# Patient Record
Sex: FEMALE | Race: BLACK OR AFRICAN AMERICAN | Employment: OTHER | URBAN - METROPOLITAN AREA
[De-identification: names, ages, dates, MRNs, and addresses within clinical notes are randomized per-mention and may not be internally consistent; named-entity substitution may affect disease eponyms.]

---

## 2021-09-20 ENCOUNTER — TRANSCRIBE ORDER (OUTPATIENT)
Dept: SCHEDULING | Age: 76
End: 2021-09-20

## 2021-09-20 DIAGNOSIS — Z12.31 SCREENING MAMMOGRAM FOR HIGH-RISK PATIENT: Primary | ICD-10-CM

## 2021-10-13 ENCOUNTER — HOSPITAL ENCOUNTER (OUTPATIENT)
Dept: MAMMOGRAPHY | Age: 76
Discharge: HOME | End: 2021-10-13
Attending: FAMILY MEDICINE
Payer: MEDICARE

## 2021-10-13 DIAGNOSIS — Z12.31 SCREENING MAMMOGRAM FOR HIGH-RISK PATIENT: ICD-10-CM

## 2021-10-13 PROCEDURE — 77067 SCR MAMMO BI INCL CAD: CPT

## 2022-02-07 ENCOUNTER — OFFICE VISIT (OUTPATIENT)
Dept: NEUROLOGY | Age: 77
End: 2022-02-07

## 2022-07-11 ENCOUNTER — TRANSCRIBE ORDER (OUTPATIENT)
Dept: SCHEDULING | Age: 77
End: 2022-07-11

## 2022-07-11 DIAGNOSIS — R07.9 CHEST PAIN: Primary | ICD-10-CM

## 2022-07-26 ENCOUNTER — HOSPITAL ENCOUNTER (OUTPATIENT)
Dept: NON INVASIVE DIAGNOSTICS | Age: 77
Discharge: HOME | End: 2022-07-26
Attending: INTERNAL MEDICINE
Payer: MEDICARE

## 2022-07-26 VITALS
DIASTOLIC BLOOD PRESSURE: 74 MMHG | HEIGHT: 65 IN | BODY MASS INDEX: 29.99 KG/M2 | WEIGHT: 180 LBS | SYSTOLIC BLOOD PRESSURE: 134 MMHG

## 2022-07-26 DIAGNOSIS — R07.9 CHEST PAIN: ICD-10-CM

## 2022-07-26 LAB
STRESS ANGINA INDEX: 0
STRESS BASELINE DIAS BP: 74 MMHG
STRESS BASELINE HR: 56 BPM
STRESS BASELINE ST DEPRESSION: 0 MM
STRESS BASELINE SYS BP: 134 MMHG
STRESS ESTIMATED WORKLOAD: 4.2 METS
STRESS EXERCISE DUR MIN: 6 MIN
STRESS EXERCISE DUR SEC: 29 SEC
STRESS O2 SAT PEAK: 100 %
STRESS O2 SAT REST: 100 %
STRESS PEAK DIAS BP: 80 MMHG
STRESS PEAK SYS BP: 203 MMHG
STRESS PERCENT HR ACHIEVED: 96 %
STRESS POST PEAK HR: 137 BPM
STRESS RATE PRESSURE PRODUCT: NORMAL BPM*MMHG
STRESS TARGET HR: 142 BPM

## 2022-07-26 PROCEDURE — 93017 CV STRESS TEST TRACING ONLY: CPT

## 2022-07-26 PROCEDURE — 93018 CV STRESS TEST I&R ONLY: CPT | Performed by: SPECIALIST

## 2022-07-26 PROCEDURE — 93016 CV STRESS TEST SUPVJ ONLY: CPT | Performed by: SPECIALIST

## 2022-09-22 ENCOUNTER — TRANSCRIBE ORDER (OUTPATIENT)
Dept: SCHEDULING | Age: 77
End: 2022-09-22

## 2022-09-22 DIAGNOSIS — Z12.31 SCREENING MAMMOGRAM FOR HIGH-RISK PATIENT: Primary | ICD-10-CM

## 2022-10-18 ENCOUNTER — HOSPITAL ENCOUNTER (OUTPATIENT)
Dept: MAMMOGRAPHY | Age: 77
Discharge: HOME | End: 2022-10-18
Attending: FAMILY MEDICINE
Payer: MEDICARE

## 2022-10-18 DIAGNOSIS — Z12.31 SCREENING MAMMOGRAM FOR HIGH-RISK PATIENT: ICD-10-CM

## 2022-10-18 PROCEDURE — 77067 SCR MAMMO BI INCL CAD: CPT

## 2022-12-28 ENCOUNTER — HOSPITAL ENCOUNTER (EMERGENCY)
Age: 77
Discharge: HOME | End: 2022-12-28
Attending: STUDENT IN AN ORGANIZED HEALTH CARE EDUCATION/TRAINING PROGRAM
Payer: MEDICARE

## 2022-12-28 ENCOUNTER — APPOINTMENT (OUTPATIENT)
Dept: GENERAL RADIOLOGY | Age: 77
End: 2022-12-28
Attending: STUDENT IN AN ORGANIZED HEALTH CARE EDUCATION/TRAINING PROGRAM
Payer: MEDICARE

## 2022-12-28 VITALS
BODY MASS INDEX: 32.71 KG/M2 | TEMPERATURE: 98.2 F | RESPIRATION RATE: 20 BRPM | WEIGHT: 191.58 LBS | HEIGHT: 64 IN | DIASTOLIC BLOOD PRESSURE: 85 MMHG | SYSTOLIC BLOOD PRESSURE: 158 MMHG | OXYGEN SATURATION: 100 % | HEART RATE: 81 BPM

## 2022-12-28 DIAGNOSIS — U07.1 COVID-19: Primary | ICD-10-CM

## 2022-12-28 LAB
ALBUMIN SERPL-MCNC: 3.6 G/DL (ref 3.5–5)
ALBUMIN/GLOB SERPL: 1.1 {RATIO} (ref 1.1–2.2)
ALP SERPL-CCNC: 101 U/L (ref 45–117)
ALT SERPL-CCNC: 36 U/L (ref 12–78)
ANION GAP SERPL CALC-SCNC: 4 MMOL/L (ref 5–15)
AST SERPL-CCNC: 24 U/L (ref 15–37)
BASOPHILS # BLD: 0 K/UL (ref 0–0.1)
BASOPHILS NFR BLD: 1 % (ref 0–1)
BILIRUB SERPL-MCNC: 0.4 MG/DL (ref 0.2–1)
BNP SERPL-MCNC: 67 PG/ML
BUN SERPL-MCNC: 14 MG/DL (ref 6–20)
BUN/CREAT SERPL: 11 (ref 12–20)
CALCIUM SERPL-MCNC: 8.7 MG/DL (ref 8.5–10.1)
CHLORIDE SERPL-SCNC: 108 MMOL/L (ref 97–108)
CO2 SERPL-SCNC: 25 MMOL/L (ref 21–32)
CREAT SERPL-MCNC: 1.26 MG/DL (ref 0.55–1.02)
DIFFERENTIAL METHOD BLD: NORMAL
EOSINOPHIL # BLD: 0.2 K/UL (ref 0–0.4)
EOSINOPHIL NFR BLD: 5 % (ref 0–7)
ERYTHROCYTE [DISTWIDTH] IN BLOOD BY AUTOMATED COUNT: 13.8 % (ref 11.5–14.5)
GLOBULIN SER CALC-MCNC: 3.3 G/DL (ref 2–4)
GLUCOSE SERPL-MCNC: 158 MG/DL (ref 65–100)
HCT VFR BLD AUTO: 39.2 % (ref 35–47)
HGB BLD-MCNC: 13.3 G/DL (ref 11.5–16)
IMM GRANULOCYTES # BLD AUTO: 0 K/UL (ref 0–0.04)
IMM GRANULOCYTES NFR BLD AUTO: 0 % (ref 0–0.5)
LYMPHOCYTES # BLD: 1.6 K/UL (ref 0.8–3.5)
LYMPHOCYTES NFR BLD: 34 % (ref 12–49)
MCH RBC QN AUTO: 29.5 PG (ref 26–34)
MCHC RBC AUTO-ENTMCNC: 33.9 G/DL (ref 30–36.5)
MCV RBC AUTO: 86.9 FL (ref 80–99)
MONOCYTES # BLD: 0.4 K/UL (ref 0–1)
MONOCYTES NFR BLD: 9 % (ref 5–13)
NEUTS SEG # BLD: 2.3 K/UL (ref 1.8–8)
NEUTS SEG NFR BLD: 51 % (ref 32–75)
NRBC # BLD: 0 K/UL (ref 0–0.01)
NRBC BLD-RTO: 0 PER 100 WBC
PLATELET # BLD AUTO: 271 K/UL (ref 150–400)
PMV BLD AUTO: 9.5 FL (ref 8.9–12.9)
POTASSIUM SERPL-SCNC: 4.3 MMOL/L (ref 3.5–5.1)
PROT SERPL-MCNC: 6.9 G/DL (ref 6.4–8.2)
RBC # BLD AUTO: 4.51 M/UL (ref 3.8–5.2)
SODIUM SERPL-SCNC: 137 MMOL/L (ref 136–145)
TROPONIN-HIGH SENSITIVITY: 7 NG/L (ref 0–51)
WBC # BLD AUTO: 4.6 K/UL (ref 3.6–11)

## 2022-12-28 PROCEDURE — 99285 EMERGENCY DEPT VISIT HI MDM: CPT

## 2022-12-28 PROCEDURE — 84484 ASSAY OF TROPONIN QUANT: CPT

## 2022-12-28 PROCEDURE — 71046 X-RAY EXAM CHEST 2 VIEWS: CPT

## 2022-12-28 PROCEDURE — 83880 ASSAY OF NATRIURETIC PEPTIDE: CPT

## 2022-12-28 PROCEDURE — 93005 ELECTROCARDIOGRAM TRACING: CPT

## 2022-12-28 PROCEDURE — 85025 COMPLETE CBC W/AUTO DIFF WBC: CPT

## 2022-12-28 PROCEDURE — 36415 COLL VENOUS BLD VENIPUNCTURE: CPT

## 2022-12-28 PROCEDURE — 80053 COMPREHEN METABOLIC PANEL: CPT

## 2022-12-28 NOTE — ED PROVIDER NOTES
EMERGENCY DEPARTMENT HISTORY AND PHYSICAL EXAM      Date: 12/28/2022  Patient Name: Shabbir Garay    History of Presenting Illness     Chief Complaint   Patient presents with    Shortness of Breath    Positive For Covid-19     Sxs started 12.24,       History Provided By: Patient    Shabbir Garay, 76 y.o. female     Is well-appearing 66-year-old female with history of diabetes, hypertension presenting with concern of COVID-19. Patient states that she was diagnosed with this today after taking a rapid test due to cough, congestion and cold symptoms. Patient states that she was instructed to present to ED due to concern of comorbidities. Patient states that she has had symptoms about 4 to 5 days, states that they are mild, states that she has had intermittent shortness of breath but denies any chest pain, leg swelling or other acute symptoms today. Patient states she is fully vaccinated for COVID-19, denies any nausea, vomiting or diarrhea, no other complaints today. There are no other complaints, changes, or physical findings at this time. PCP: Hilario Benavides MD    No current facility-administered medications on file prior to encounter. No current outpatient medications on file prior to encounter. Past History     Past Medical History:  Past Medical History:   Diagnosis Date    Menopause        Past Surgical History:  Past Surgical History:   Procedure Laterality Date    HX HYSTERECTOMY      HX OOPHORECTOMY         Family History:  No family history on file. Social History: Allergies:  No Known Allergies      Review of Systems   Review of Systems   Constitutional:  Positive for activity change, chills and fatigue. Negative for appetite change and fever. HENT:  Positive for congestion and rhinorrhea. Negative for sore throat. Respiratory:  Positive for cough and shortness of breath. Negative for chest tightness.     Cardiovascular:  Negative for chest pain and leg swelling. Gastrointestinal:  Negative for abdominal pain, constipation, diarrhea, nausea and vomiting. Genitourinary:  Negative for decreased urine volume, difficulty urinating and dysuria. Musculoskeletal:  Negative for arthralgias and myalgias. Skin:  Negative for rash. Neurological:  Negative for weakness and headaches. Hematological:  Does not bruise/bleed easily. Psychiatric/Behavioral:  Negative for confusion. Physical Exam   Physical Exam  Vitals reviewed. Constitutional:       General: She is not in acute distress. Appearance: She is not ill-appearing. HENT:      Head: Normocephalic and atraumatic. Mouth/Throat:      Mouth: Mucous membranes are moist.   Eyes:      Conjunctiva/sclera: Conjunctivae normal.   Cardiovascular:      Rate and Rhythm: Normal rate. Pulses: Normal pulses. Pulmonary:      Effort: Pulmonary effort is normal. No respiratory distress. Breath sounds: No decreased breath sounds, wheezing, rhonchi or rales. Abdominal:      General: Abdomen is flat. Palpations: Abdomen is soft. Musculoskeletal:         General: No deformity. Cervical back: Normal range of motion and neck supple. Right lower leg: No edema. Left lower leg: No edema. Skin:     General: Skin is warm and dry. Neurological:      General: No focal deficit present. Mental Status: She is alert and oriented to person, place, and time.    Psychiatric:         Mood and Affect: Mood normal.         Behavior: Behavior normal.       Diagnostic Study Results     Labs -     Recent Results (from the past 24 hour(s))   EKG, 12 LEAD, INITIAL    Collection Time: 12/28/22  4:24 PM   Result Value Ref Range    Ventricular Rate 79 BPM    Atrial Rate 79 BPM    P-R Interval 162 ms    QRS Duration 82 ms    Q-T Interval 370 ms    QTC Calculation (Bezet) 424 ms    Calculated P Axis 35 degrees    Calculated R Axis 41 degrees    Calculated T Axis 35 degrees    Diagnosis       ** Poor data quality, interpretation may be adversely affected  Sinus rhythm with sinus arrhythmia with occasional premature ventricular   complexes  No previous ECGs available     CBC WITH AUTOMATED DIFF    Collection Time: 12/28/22  4:27 PM   Result Value Ref Range    WBC 4.6 3.6 - 11.0 K/uL    RBC 4.51 3.80 - 5.20 M/uL    HGB 13.3 11.5 - 16.0 g/dL    HCT 39.2 35.0 - 47.0 %    MCV 86.9 80.0 - 99.0 FL    MCH 29.5 26.0 - 34.0 PG    MCHC 33.9 30.0 - 36.5 g/dL    RDW 13.8 11.5 - 14.5 %    PLATELET 471 468 - 531 K/uL    MPV 9.5 8.9 - 12.9 FL    NRBC 0.0 0  WBC    ABSOLUTE NRBC 0.00 0.00 - 0.01 K/uL    NEUTROPHILS 51 32 - 75 %    LYMPHOCYTES 34 12 - 49 %    MONOCYTES 9 5 - 13 %    EOSINOPHILS 5 0 - 7 %    BASOPHILS 1 0 - 1 %    IMMATURE GRANULOCYTES 0 0.0 - 0.5 %    ABS. NEUTROPHILS 2.3 1.8 - 8.0 K/UL    ABS. LYMPHOCYTES 1.6 0.8 - 3.5 K/UL    ABS. MONOCYTES 0.4 0.0 - 1.0 K/UL    ABS. EOSINOPHILS 0.2 0.0 - 0.4 K/UL    ABS. BASOPHILS 0.0 0.0 - 0.1 K/UL    ABS. IMM. GRANS. 0.0 0.00 - 0.04 K/UL    DF AUTOMATED     METABOLIC PANEL, COMPREHENSIVE    Collection Time: 12/28/22  4:27 PM   Result Value Ref Range    Sodium 137 136 - 145 mmol/L    Potassium 4.3 3.5 - 5.1 mmol/L    Chloride 108 97 - 108 mmol/L    CO2 25 21 - 32 mmol/L    Anion gap 4 (L) 5 - 15 mmol/L    Glucose 158 (H) 65 - 100 mg/dL    BUN 14 6 - 20 MG/DL    Creatinine 1.26 (H) 0.55 - 1.02 MG/DL    BUN/Creatinine ratio 11 (L) 12 - 20      eGFR 43 (L) >60 ml/min/1.73m2    Calcium 8.7 8.5 - 10.1 MG/DL    Bilirubin, total 0.4 0.2 - 1.0 MG/DL    ALT (SGPT) 36 12 - 78 U/L    AST (SGOT) 24 15 - 37 U/L    Alk.  phosphatase 101 45 - 117 U/L    Protein, total 6.9 6.4 - 8.2 g/dL    Albumin 3.6 3.5 - 5.0 g/dL    Globulin 3.3 2.0 - 4.0 g/dL    A-G Ratio 1.1 1.1 - 2.2     TROPONIN-HIGH SENSITIVITY    Collection Time: 12/28/22  4:27 PM   Result Value Ref Range    Troponin-High Sensitivity 7 0 - 51 ng/L   NT-PRO BNP    Collection Time: 12/28/22  4:27 PM   Result Value Ref Range    NT pro-BNP 67 <450 PG/ML       Radiologic Studies -   XR CHEST PA LAT   Final Result   Very mild left basilar atelectasis. CT Results  (Last 48 hours)      None          CXR Results  (Last 48 hours)                 12/28/22 1656  XR CHEST PA LAT Final result    Impression:  Very mild left basilar atelectasis. Narrative: Indication: Shortness of breath. Exam: PA and lateral views of the chest.       There is no prior study for direct comparison. Findings: Cardiomediastinal silhouette is within normal limits. There is very   mild left basilar atelectasis. There is no pleural fluid. There is no   pneumothorax. Medical Decision Making   I am the first provider for this patient. I reviewed the vital signs, available nursing notes, past medical history, past surgical history, family history and social history. Vital Signs-Reviewed the patient's vital signs. Patient Vitals for the past 12 hrs:   Temp Pulse Resp BP SpO2   12/28/22 1606 98.2 °F (36.8 °C) 81 20 (!) 158/85 100 %       Records Reviewed: Nursing records and medical records reviewed    Ddx: COVID-19, pneumonia, URI    Initial assessment performed. The patients presenting problems have been discussed, and they are in agreement with the care plan formulated and outlined with them. I have encouraged them to ask questions as they arise throughout their visit. MDM  Number of Diagnoses or Management Options  COVID-19  Diagnosis management comments: Patient is a 78-year-old female presenting with concerns COVID-19, present stable vital signs within normal limits, x ray neg for pna, oxygen 100%, patient is well-appearing, lungs are clear bilaterally, no edema, Nad, out of window for paxlovid, mild elevation in cre, she will follow up with pcp. Patient stable to be discharged at this time. Procedures    Critical Care: none    Disposition: dc    DISCHARGE PLAN:  1.  There are no discharge medications for this patient. 2.   Follow-up Information       Follow up With Specialties Details Why Contact Info    radhika Caldernó MD Family Medicine Schedule an appointment as soon as possible for a visit in 1 week  [phone]      [facility] EMERGENCY DEPT Emergency Medicine  If symptoms worsen [address]  [address] [facility]  [phone]          3. Return to ED if worse     Diagnosis     Clinical Impression:   1. COVID-19        Attestations:    Murali Spann MD    Please note that this dictation was completed with [employer], the [employer] voice recognition software. Quite often unanticipated grammatical, syntax, homophones, and other interpretive errors are inadvertently transcribed by the computer software. Please disregard these errors. Please excuse any errors that have escaped final proofreading. Thank you.

## 2022-12-28 NOTE — ED NOTES
Pt discharged by SUSANA Carvajal. Discharge instructions discussed and pt given opportunity to ask questions.  Pt ambulatory out of ED

## 2022-12-29 LAB
ATRIAL RATE: 79 BPM
CALCULATED P AXIS, ECG09: 35 DEGREES
CALCULATED R AXIS, ECG10: 41 DEGREES
CALCULATED T AXIS, ECG11: 35 DEGREES
DIAGNOSIS, 93000: NORMAL
P-R INTERVAL, ECG05: 162 MS
Q-T INTERVAL, ECG07: 370 MS
QRS DURATION, ECG06: 82 MS
QTC CALCULATION (BEZET), ECG08: 424 MS
VENTRICULAR RATE, ECG03: 79 BPM

## 2023-02-15 ENCOUNTER — HOSPITAL ENCOUNTER (OUTPATIENT)
Dept: GENERAL RADIOLOGY | Age: 78
Discharge: HOME | End: 2023-02-15
Payer: MEDICARE

## 2023-02-15 ENCOUNTER — TRANSCRIBE ORDER (OUTPATIENT)
Dept: REGISTRATION | Age: 78
End: 2023-02-15

## 2023-02-15 DIAGNOSIS — K59.01 CONSTIPATION BY DELAYED COLONIC TRANSIT: ICD-10-CM

## 2023-02-15 DIAGNOSIS — K59.01 CONSTIPATION BY DELAYED COLONIC TRANSIT: Primary | ICD-10-CM

## 2023-02-15 PROCEDURE — 74018 RADEX ABDOMEN 1 VIEW: CPT

## 2023-03-13 ENCOUNTER — TRANSCRIBE ORDER (OUTPATIENT)
Dept: SCHEDULING | Age: 78
End: 2023-03-13

## 2023-03-13 DIAGNOSIS — E11.22 CHRONIC KIDNEY DISEASE DUE TO TYPE 2 DIABETES MELLITUS (HCC): ICD-10-CM

## 2023-03-13 DIAGNOSIS — N18.31 STAGE 3A CHRONIC KIDNEY DISEASE (HCC): Primary | ICD-10-CM

## 2023-03-13 DIAGNOSIS — I12.9 CHRONIC KIDNEY DISEASE DUE TO HYPERTENSION: ICD-10-CM

## 2023-03-13 DIAGNOSIS — I10 ESSENTIAL HYPERTENSION: ICD-10-CM

## 2023-03-15 ENCOUNTER — TRANSCRIBE ORDER (OUTPATIENT)
Dept: SCHEDULING | Age: 78
End: 2023-03-15

## 2023-03-15 DIAGNOSIS — N18.31 STAGE 3A CHRONIC KIDNEY DISEASE (CKD) (HCC): Primary | ICD-10-CM

## 2023-03-15 DIAGNOSIS — I12.9 CHRONIC KIDNEY DISEASE DUE TO HYPERTENSION: ICD-10-CM

## 2023-03-15 DIAGNOSIS — I10 ESSENTIAL HYPERTENSION: ICD-10-CM

## 2023-03-15 DIAGNOSIS — E11.22 CHRONIC KIDNEY DISEASE DUE TO TYPE 2 DIABETES MELLITUS (HCC): ICD-10-CM

## 2023-04-19 ENCOUNTER — HOSPITAL ENCOUNTER (OUTPATIENT)
Dept: ULTRASOUND IMAGING | Age: 78
Discharge: HOME | End: 2023-04-19
Attending: INTERNAL MEDICINE
Payer: MEDICARE

## 2023-04-19 DIAGNOSIS — I12.9 CHRONIC KIDNEY DISEASE DUE TO HYPERTENSION: ICD-10-CM

## 2023-04-19 DIAGNOSIS — N18.31 STAGE 3A CHRONIC KIDNEY DISEASE (CKD) (HCC): ICD-10-CM

## 2023-04-19 DIAGNOSIS — I10 ESSENTIAL HYPERTENSION: ICD-10-CM

## 2023-04-19 DIAGNOSIS — E11.22 CHRONIC KIDNEY DISEASE DUE TO TYPE 2 DIABETES MELLITUS (HCC): ICD-10-CM

## 2023-04-19 PROCEDURE — 76770 US EXAM ABDO BACK WALL COMP: CPT

## 2023-06-27 ENCOUNTER — HOSPITAL ENCOUNTER (OUTPATIENT)
Facility: HOSPITAL | Age: 78
Discharge: HOME | End: 2023-06-30
Payer: MEDICARE

## 2023-06-27 DIAGNOSIS — K58.1 IRRITABLE BOWEL SYNDROME WITH CONSTIPATION: ICD-10-CM

## 2023-06-27 DIAGNOSIS — R13.10 DYSPHAGIA, UNSPECIFIED TYPE: ICD-10-CM

## 2023-06-27 DIAGNOSIS — Z86.010 PERSONAL HISTORY OF COLONIC POLYPS: ICD-10-CM

## 2023-06-27 PROCEDURE — 74220 X-RAY XM ESOPHAGUS 1CNTRST: CPT

## 2023-09-26 RX ORDER — LOSARTAN POTASSIUM 50 MG/1
50 TABLET ORAL DAILY
COMMUNITY

## 2023-09-26 RX ORDER — ATORVASTATIN CALCIUM 40 MG/1
40 TABLET, FILM COATED ORAL DAILY
COMMUNITY

## 2023-09-26 RX ORDER — OMEPRAZOLE 20 MG/1
20 CAPSULE, DELAYED RELEASE ORAL DAILY
COMMUNITY

## 2023-09-27 ENCOUNTER — ANESTHESIA EVENT (OUTPATIENT)
Facility: HOSPITAL | Age: 78
End: 2023-09-27
Payer: MEDICARE

## 2023-09-27 ENCOUNTER — ANESTHESIA (OUTPATIENT)
Facility: HOSPITAL | Age: 78
End: 2023-09-27
Payer: MEDICARE

## 2023-09-27 ENCOUNTER — HOSPITAL ENCOUNTER (OUTPATIENT)
Facility: HOSPITAL | Age: 78
Setting detail: OUTPATIENT SURGERY
Discharge: HOME | End: 2023-09-27
Attending: INTERNAL MEDICINE | Admitting: INTERNAL MEDICINE
Payer: MEDICARE

## 2023-09-27 VITALS
OXYGEN SATURATION: 99 % | BODY MASS INDEX: 30.82 KG/M2 | DIASTOLIC BLOOD PRESSURE: 60 MMHG | TEMPERATURE: 98.1 F | WEIGHT: 185 LBS | RESPIRATION RATE: 20 BRPM | HEART RATE: 60 BPM | SYSTOLIC BLOOD PRESSURE: 157 MMHG | HEIGHT: 65 IN

## 2023-09-27 PROCEDURE — 3600007502: Performed by: INTERNAL MEDICINE

## 2023-09-27 PROCEDURE — 2500000003 HC RX 250 WO HCPCS: Performed by: NURSE ANESTHETIST, CERTIFIED REGISTERED

## 2023-09-27 PROCEDURE — 3600007512: Performed by: INTERNAL MEDICINE

## 2023-09-27 PROCEDURE — 3700000000 HC ANESTHESIA ATTENDED CARE: Performed by: INTERNAL MEDICINE

## 2023-09-27 PROCEDURE — 88305 TISSUE EXAM BY PATHOLOGIST: CPT

## 2023-09-27 PROCEDURE — C1889 IMPLANT/INSERT DEVICE, NOC: HCPCS | Performed by: INTERNAL MEDICINE

## 2023-09-27 PROCEDURE — 2709999900 HC NON-CHARGEABLE SUPPLY: Performed by: INTERNAL MEDICINE

## 2023-09-27 PROCEDURE — 2720000010 HC SURG SUPPLY STERILE: Performed by: INTERNAL MEDICINE

## 2023-09-27 PROCEDURE — 3700000001 HC ADD 15 MINUTES (ANESTHESIA): Performed by: INTERNAL MEDICINE

## 2023-09-27 PROCEDURE — 6360000002 HC RX W HCPCS: Performed by: NURSE ANESTHETIST, CERTIFIED REGISTERED

## 2023-09-27 PROCEDURE — 2580000003 HC RX 258: Performed by: INTERNAL MEDICINE

## 2023-09-27 PROCEDURE — 7100000011 HC PHASE II RECOVERY - ADDTL 15 MIN: Performed by: INTERNAL MEDICINE

## 2023-09-27 PROCEDURE — 7100000010 HC PHASE II RECOVERY - FIRST 15 MIN: Performed by: INTERNAL MEDICINE

## 2023-09-27 DEVICE — CLIP LIG L235CM RESOL 360 BX/20: Type: IMPLANTABLE DEVICE | Site: ASCENDING COLON | Status: FUNCTIONAL

## 2023-09-27 RX ORDER — LIDOCAINE HYDROCHLORIDE 20 MG/ML
INJECTION, SOLUTION EPIDURAL; INFILTRATION; INTRACAUDAL; PERINEURAL PRN
Status: DISCONTINUED | OUTPATIENT
Start: 2023-09-27 | End: 2023-09-27 | Stop reason: SDUPTHER

## 2023-09-27 RX ORDER — LUBIPROSTONE 8 UG/1
8 CAPSULE ORAL EVERY OTHER DAY
COMMUNITY

## 2023-09-27 RX ORDER — SODIUM CHLORIDE 0.9 % (FLUSH) 0.9 %
5-40 SYRINGE (ML) INJECTION EVERY 12 HOURS SCHEDULED
Status: DISCONTINUED | OUTPATIENT
Start: 2023-09-27 | End: 2023-09-27 | Stop reason: HOSPADM

## 2023-09-27 RX ORDER — SODIUM CHLORIDE 0.9 % (FLUSH) 0.9 %
5-40 SYRINGE (ML) INJECTION PRN
Status: DISCONTINUED | OUTPATIENT
Start: 2023-09-27 | End: 2023-09-27 | Stop reason: HOSPADM

## 2023-09-27 RX ORDER — SODIUM CHLORIDE 9 MG/ML
25 INJECTION, SOLUTION INTRAVENOUS PRN
Status: DISCONTINUED | OUTPATIENT
Start: 2023-09-27 | End: 2023-09-27 | Stop reason: HOSPADM

## 2023-09-27 RX ADMIN — PROPOFOL 50 MG: 10 INJECTION, EMULSION INTRAVENOUS at 12:18

## 2023-09-27 RX ADMIN — PROPOFOL 30 MG: 10 INJECTION, EMULSION INTRAVENOUS at 12:38

## 2023-09-27 RX ADMIN — PROPOFOL 50 MG: 10 INJECTION, EMULSION INTRAVENOUS at 12:07

## 2023-09-27 RX ADMIN — LIDOCAINE HYDROCHLORIDE 100 MG: 20 INJECTION, SOLUTION EPIDURAL; INFILTRATION; INTRACAUDAL; PERINEURAL at 12:05

## 2023-09-27 RX ADMIN — PROPOFOL 50 MG: 10 INJECTION, EMULSION INTRAVENOUS at 12:34

## 2023-09-27 RX ADMIN — SODIUM CHLORIDE: 9 INJECTION, SOLUTION INTRAVENOUS at 12:04

## 2023-09-27 RX ADMIN — PROPOFOL 50 MG: 10 INJECTION, EMULSION INTRAVENOUS at 12:30

## 2023-09-27 RX ADMIN — PROPOFOL 50 MG: 10 INJECTION, EMULSION INTRAVENOUS at 12:09

## 2023-09-27 RX ADMIN — PROPOFOL 50 MG: 10 INJECTION, EMULSION INTRAVENOUS at 12:21

## 2023-09-27 RX ADMIN — PROPOFOL 50 MG: 10 INJECTION, EMULSION INTRAVENOUS at 12:11

## 2023-09-27 RX ADMIN — PROPOFOL 50 MG: 10 INJECTION, EMULSION INTRAVENOUS at 12:24

## 2023-09-27 RX ADMIN — PROPOFOL 50 MG: 10 INJECTION, EMULSION INTRAVENOUS at 12:06

## 2023-09-27 ASSESSMENT — PAIN - FUNCTIONAL ASSESSMENT: PAIN_FUNCTIONAL_ASSESSMENT: 0-10

## 2023-09-27 NOTE — ANESTHESIA POSTPROCEDURE EVALUATION
Department of Anesthesiology  Postprocedure Note    Patient: Shira Gamez  MRN: [id number]  YOB: 1945  Date of evaluation: 9/27/2023      Procedure Summary     Date: 09/27/23 Room / Location: [facility] ENDO 03 / [facility] ENDOSCOPY    Anesthesia Start: 4327 Anesthesia Stop: 1244    Procedures:       EGD BIOPSY (Upper GI Region)      COLONOSCOPY WITH BIOPSY (Lower GI Region) Diagnosis:       Personal history of colonic polyps      Dysphagia, unspecified type      Irritable bowel syndrome with constipation    Surgeons: Jones Lara MD Responsible Provider: Satish Coates MD    Anesthesia Type: MAC ASA Status: 2          Anesthesia Type: No value filed.     Luz Marina Phase I: Luz Marina Score: 10    Luz Marina Phase II: Luz Marina Score: 10      Anesthesia Post Evaluation    Patient location during evaluation: PACU  Patient participation: complete - patient participated  Level of consciousness: sleepy but conscious and responsive to verbal stimuli  Pain score: 2  Airway patency: patent  Nausea & Vomiting: no vomiting and no nausea  Complications: no  Cardiovascular status: blood pressure returned to baseline and hemodynamically stable  Respiratory status: acceptable  Hydration status: stable  Multimodal analgesia pain management approach  Pain management: adequate

## 2023-09-27 NOTE — OP NOTE
NAME:  Gatito Terrazas   :   1945   MRN:   [id number]     Date/Time:  2023 12:41 PM    Esophagogastroduodenoscopy (EGD) Procedure Note    Procedure: Esophagogastroduodenoscopy with biopsy    Indication: Dyphagia/odynophagia, Nausea  Pre-operative Diagnosis: see indication above  Post-operative Diagnosis: see findings below  :  Richa Issa MD  Referring Provider:   --Becky Crawford MD    Exam:  Airway: clear, no airway problems anticipated  Heart: RRR, without gallops or rubs  Lungs: clear bilaterally without wheezes, crackles, or rhonchi  Abdomen: soft, nontender, nondistended, bowel sounds present  Mental Status: awake, alert and oriented to person, place and time     Anethesia/Sedation:  MAC anesthesia Propofol  Procedure Details   After informed consent was obtained for the procedure, with all risks and benefits of procedure explained the patient was taken to the endoscopy suite and placed in the left lateral decubitus position. Following sequential administration of sedation as per above, the [id number] gastroscope was inserted into the mouth and advanced under direct vision to third portion of the duodenum. A careful inspection was made as the gastroscope was withdrawn, including a retroflexed view of the proximal stomach; findings and interventions are described below. Findings:   Normal proximal and mid esophagus. Biopsies taken of mid esophagus to evaluate for Eosinophilic esophagitis. Notably no strictures/narrowing  Z-line slightly irregular. Biopsied  4 cm sliding hiatal hernia from 39 cm to 43 cm distal to the incisors  Mild, patchy, non-erosive antral gastropathy. Biopsied  Stomach otherwise normal, including retroflexion. Evidence of prior lap band surgery  Normal duodenal bulb and 2nd/3rd portion of the duodenum    Therapies:  1. Biopsies    Specimens: 1. Duodenum 2. Gastric 3. Distal esophagus 4. Mid esophagus    EBL:  None.          Complications:

## 2023-09-27 NOTE — PROGRESS NOTES
ARRIVAL INFORMATION:  Verified patient name and date of birth, scheduled procedure, and informed consent. : Rhoda Mejia (son) contact number: [phone]  Physician and staff can share information with the . Belongings with patient include:  Clothing,Jewelry, cane    GI FOCUSED ASSESSMENT:  Neuro: Awake, alert, oriented x4  Respiratory: even and unlabored   GI: soft and non-distended  EKG Rhythm: normal sinus rhythm    Education:Reviewed general discharge instructions and  information. Rhoda La    The risks and benefits of the bite block have been explained to patient. Patient verbalizes understanding.

## 2023-09-27 NOTE — OP NOTE
NAME:  Skyler Lambert   :   1945   MRN:   [id number]     Date/Time:  2023 12:43 PM    Colonoscopy Operative Report    Procedure Type:   Colonoscopy with polypectomy (cold snare), Endoscopic mucosal resection     Indications:     Personal history of colon polyps (screening only)  Pre-operative Diagnosis: see indication above  Post-operative Diagnosis:  See findings below  :  Nely Eng MD  Referring Provider: --Mina Barahona MD    Exam:  Airway: clear, no airway problems anticipated  Heart: RRR, without gallops or rubs  Lungs: clear bilaterally without wheezes, crackles, or rhonchi  Abdomen: soft, nontender, nondistended, bowel sounds present  Mental Status: awake, alert and oriented to person, place and time    Sedation:  MAC anesthesia Propofol  Procedure Details:  After informed consent was obtained with all risks and benefits of procedure explained and preoperative exam completed, the patient was taken to the endoscopy suite and placed in the left lateral decubitus position. Upon sequential sedation as per above, a digital rectal exam was performed demonstrating internal hemorrhoids. The Olympus videocolonoscope  was inserted in the rectum and carefully advanced to the cecum, which was identified by the ileocecal valve and appendiceal orifice. The quality of preparation was good. The colonoscope was slowly withdrawn with careful evaluation between folds. Retroflexion in the rectum was completed demonstrating internal hemorrhoids. Findings:   5 mm sessile polyp in cecum. Removed by cold snare polypectomy  15 mm sessile polyp in proximal ascending colon, one fold distal to the IC valve. '[place]' lift followed by en bloc endoscopic mucosal resection. Prophylactic clip placed x 1 with closure of defect  Medium sized internal hemorrhoids seen on retroflexion  Otherwise normal colonoscopy through to the cecum    Specimen Removed:  1. Cecal polyp 2.  Proximal ascending

## 2023-09-27 NOTE — PROCEDURES
Endoscopy Case End Note:    1239:  Procedure scope was pre-cleaned, per protocol, at bedside by Miguel An RN.      [id number]:  Report received from anesthesia Etta Smith CRNA. See anesthesia flowsheet for intra-procedure vital signs and events. 1245:  cane returned to patient.

## 2023-10-25 ENCOUNTER — HOSPITAL ENCOUNTER (OUTPATIENT)
Facility: HOSPITAL | Age: 78
Discharge: HOME | End: 2023-10-28
Payer: MEDICARE

## 2023-10-25 VITALS — WEIGHT: 184.97 LBS | BODY MASS INDEX: 30.82 KG/M2 | HEIGHT: 65 IN

## 2023-10-25 DIAGNOSIS — Z12.31 VISIT FOR SCREENING MAMMOGRAM: ICD-10-CM

## 2023-10-25 PROCEDURE — 77067 SCR MAMMO BI INCL CAD: CPT

## 2024-08-13 ENCOUNTER — HOSPITAL ENCOUNTER (OUTPATIENT)
Facility: HOSPITAL | Age: 79
Discharge: HOME | End: 2024-08-16
Attending: INTERNAL MEDICINE
Payer: MEDICARE

## 2024-08-13 DIAGNOSIS — Z78.0 ASYMPTOMATIC MENOPAUSAL STATE: ICD-10-CM

## 2024-08-13 PROCEDURE — 77080 DXA BONE DENSITY AXIAL: CPT

## 2024-10-09 ENCOUNTER — TRANSCRIBE ORDERS (OUTPATIENT)
Facility: HOSPITAL | Age: 79
End: 2024-10-09

## 2024-10-09 DIAGNOSIS — Z12.31 VISIT FOR SCREENING MAMMOGRAM: Primary | ICD-10-CM

## 2024-11-19 ENCOUNTER — HOSPITAL ENCOUNTER (OUTPATIENT)
Facility: HOSPITAL | Age: 79
Discharge: HOME | End: 2024-11-22
Attending: INTERNAL MEDICINE
Payer: MEDICARE

## 2024-11-19 VITALS — WEIGHT: 184.97 LBS | BODY MASS INDEX: 30.82 KG/M2 | HEIGHT: 65 IN

## 2024-11-19 DIAGNOSIS — Z12.31 VISIT FOR SCREENING MAMMOGRAM: ICD-10-CM

## 2024-11-19 PROCEDURE — 77063 BREAST TOMOSYNTHESIS BI: CPT

## (undated) DEVICE — FIAPC® PROBE W/ FILTER 2200 C OD 2.3MM/6.9FR; L 2.2M/7.2FT: Brand: ERBE

## (undated) DEVICE — INJECTION THERAPY NEEDLE CATHETER: Brand: INTERJECT

## (undated) DEVICE — CATHETER IV 18GA L1.16IN OD1.27-1.3462MM ID0.9398-1.016MM

## (undated) DEVICE — POLYP TRAP

## (undated) DEVICE — IV START KIT: Brand: MEDLINE

## (undated) DEVICE — SET GRAV CK VLV NEEDLESS ST 3 GANGED 4WAY STPCOCK HI FLO 10

## (undated) DEVICE — FIAPC® PROBE W/ FILTER 2200 A OD 2.3MM/6.9FR; L 2.2M/7.2FT: Brand: ERBE

## (undated) DEVICE — TRAP ENDOSCP POLYP 2 CHMBR DRAWER TYP

## (undated) DEVICE — CATHETER IV 24GA L0.75IN OD0.6604-0.7366MM

## (undated) DEVICE — CUFF BLD PRSS AD CLTH SGL TB W/ BAYNT CONN ROUNDED CORNER

## (undated) DEVICE — TIP SUCT TRNSPAR RIB SURF STD BLB RIG NVENT W/ 5IN1 CONN DYND50138] MEDLINE INDUSTRIES INC]

## (undated) DEVICE — SINGLE-USE BIOPSY FORCEPS: Brand: RADIAL JAW 4

## (undated) DEVICE — RESOLUTION 360 CLIP

## (undated) DEVICE — FORCEPS BX L240CM JAW DIA2.4MM ORNG L CAP W/ NDL DISP RAD

## (undated) DEVICE — SNARE ENDOSCP AD L240CM LOOP W10MM SHTH DIA2.4MM RND INSUL

## (undated) DEVICE — CATHETER IV 20GA L1.16IN OD1.0414-1.1176MM ID0.762-0.8382MM

## (undated) DEVICE — ELECTRODE PT RET AD L9FT HI MOIST COND ADH HYDRGEL CORDED

## (undated) DEVICE — CATHETER IV 22GA L1IN OD0.8382-0.9144MM ID0.6096-0.6858MM 382523

## (undated) DEVICE — CONTAINER SPEC 20 ML LID NEUT BUFF FORMALIN 10 % POLYPR STS

## (undated) DEVICE — ENDOSCOPIC KIT COMPLIANCE ENDOKIT

## (undated) DEVICE — SNARE ENDOSCP L240CM LOOP W27MM SHTH DIA2.4MM WRK CHN 2.8MM

## (undated) DEVICE — NEEDLE SCLERO 25GA L240CM OD0.51MM ID0.24MM EXTN L4MM SHTH

## (undated) DEVICE — SNARE ENDOSCP POLYP MED STD AD 2.4X27X240 CM 2.8 MM OVL SENS

## (undated) DEVICE — SYSTEM REPROC CBL 3 LD DISPOSABLE